# Patient Record
Sex: MALE | URBAN - METROPOLITAN AREA
[De-identification: names, ages, dates, MRNs, and addresses within clinical notes are randomized per-mention and may not be internally consistent; named-entity substitution may affect disease eponyms.]

---

## 2018-05-16 ENCOUNTER — APPOINTMENT (OUTPATIENT)
Age: 18
Setting detail: DERMATOLOGY
End: 2018-05-17

## 2018-05-16 DIAGNOSIS — B00.50 HERPESVIRAL OCULAR DISEASE, UNSPECIFIED: ICD-10-CM

## 2018-05-16 PROCEDURE — 99202 OFFICE O/P NEW SF 15 MIN: CPT

## 2018-05-16 PROCEDURE — OTHER ADDITIONAL NOTES: OTHER

## 2018-05-16 PROCEDURE — OTHER PRESCRIPTION: OTHER

## 2018-05-16 RX ORDER — VALACYCLOVIR 1 G/1
TABLET ORAL
Qty: 14 | Refills: 2 | Status: ERX | COMMUNITY
Start: 2018-05-16

## 2018-05-16 ASSESSMENT — LOCATION SIMPLE DESCRIPTION DERM: LOCATION SIMPLE: LEFT INFERIOR EYELID

## 2018-05-16 ASSESSMENT — LOCATION ZONE DERM: LOCATION ZONE: EYELID

## 2018-05-16 ASSESSMENT — LOCATION DETAILED DESCRIPTION DERM: LOCATION DETAILED: LEFT MEDIAL INFERIOR EYELID

## 2018-05-16 NOTE — HPI: RASH
How Severe Is Your Rash?: moderate
Is This A New Presentation, Or A Follow-Up?: Rash
Additional History: Pt states “cold sore” to left eye started Monday is very sore and itchy. States he gets these to around mouth and has had around eye one other time. Has a few valcyclovir left and took those. States eye is burning but not having any visual changes. Cold sore has not completely erupted yet.

## 2018-05-16 NOTE — PROCEDURE: ADDITIONAL NOTES
Additional Notes: Refer to ophthalmology- Dr. Casanova today at 2:55 PM [facility] Additional Notes: Refer to ophthalmology- Dr. Casanova today at 2:55 PM [facility]